# Patient Record
Sex: MALE | Race: BLACK OR AFRICAN AMERICAN
[De-identification: names, ages, dates, MRNs, and addresses within clinical notes are randomized per-mention and may not be internally consistent; named-entity substitution may affect disease eponyms.]

---

## 2018-07-13 ENCOUNTER — HOSPITAL ENCOUNTER (INPATIENT)
Dept: HOSPITAL 74 - JSAMEDAYSX | Age: 33
LOS: 3 days | Discharge: HOME | DRG: 621 | End: 2018-07-16
Attending: SURGERY | Admitting: SURGERY
Payer: COMMERCIAL

## 2018-07-13 VITALS — BODY MASS INDEX: 58.1 KG/M2

## 2018-07-13 DIAGNOSIS — I10: ICD-10-CM

## 2018-07-13 DIAGNOSIS — R16.0: ICD-10-CM

## 2018-07-13 DIAGNOSIS — R94.31: ICD-10-CM

## 2018-07-13 DIAGNOSIS — E66.01: Primary | ICD-10-CM

## 2018-07-13 DIAGNOSIS — R00.0: ICD-10-CM

## 2018-07-13 LAB
ALBUMIN SERPL-MCNC: 3.7 G/DL (ref 3.4–5)
ALP SERPL-CCNC: 86 U/L (ref 45–117)
ALT SERPL-CCNC: 73 U/L (ref 12–78)
ANION GAP SERPL CALC-SCNC: 9 MMOL/L (ref 8–16)
AST SERPL-CCNC: 52 U/L (ref 15–37)
BILIRUB SERPL-MCNC: 0.5 MG/DL (ref 0.2–1)
BUN SERPL-MCNC: 13 MG/DL (ref 7–18)
CALCIUM SERPL-MCNC: 8.4 MG/DL (ref 8.5–10.1)
CHLORIDE SERPL-SCNC: 105 MMOL/L (ref 98–107)
CO2 SERPL-SCNC: 27 MMOL/L (ref 21–32)
CREAT SERPL-MCNC: 1 MG/DL (ref 0.7–1.3)
DEPRECATED RDW RBC AUTO: 13.2 % (ref 11.9–15.9)
GLUCOSE SERPL-MCNC: 153 MG/DL (ref 74–106)
HCT VFR BLD CALC: 39.3 % (ref 35.4–49)
HGB BLD-MCNC: 12.7 GM/DL (ref 11.7–16.9)
MCH RBC QN AUTO: 26.4 PG (ref 25.7–33.7)
MCHC RBC AUTO-ENTMCNC: 32.4 G/DL (ref 32–35.9)
MCV RBC: 81.6 FL (ref 80–96)
PLATELET # BLD AUTO: 315 K/MM3 (ref 134–434)
PMV BLD: 8.2 FL (ref 7.5–11.1)
POTASSIUM SERPLBLD-SCNC: 3.7 MMOL/L (ref 3.5–5.1)
PROT SERPL-MCNC: 7.6 G/DL (ref 6.4–8.2)
RBC # BLD AUTO: 4.82 M/MM3 (ref 4–5.6)
SODIUM SERPL-SCNC: 141 MMOL/L (ref 136–145)
WBC # BLD AUTO: 11.5 K/MM3 (ref 4–10)

## 2018-07-13 PROCEDURE — 0FB24ZX EXCISION OF LEFT LOBE LIVER, PERCUTANEOUS ENDOSCOPIC APPROACH, DIAGNOSTIC: ICD-10-PCS | Performed by: SURGERY

## 2018-07-13 PROCEDURE — 0WJP4ZZ INSPECTION OF GASTROINTESTINAL TRACT, PERCUTANEOUS ENDOSCOPIC APPROACH: ICD-10-PCS | Performed by: SURGERY

## 2018-07-13 PROCEDURE — 0DB64Z3 EXCISION OF STOMACH, PERCUTANEOUS ENDOSCOPIC APPROACH, VERTICAL: ICD-10-PCS | Performed by: SURGERY

## 2018-07-13 RX ADMIN — METOCLOPRAMIDE SCH MG: 5 INJECTION, SOLUTION INTRAMUSCULAR; INTRAVENOUS at 19:25

## 2018-07-13 RX ADMIN — FAMOTIDINE SCH MLS/HR: 20 INJECTION, SOLUTION INTRAVENOUS at 22:36

## 2018-07-13 RX ADMIN — ENOXAPARIN SODIUM SCH MG: 40 INJECTION SUBCUTANEOUS at 22:36

## 2018-07-13 RX ADMIN — SODIUM CHLORIDE SCH MLS: 9 INJECTION, SOLUTION INTRAVENOUS at 21:00

## 2018-07-13 NOTE — SURG
Surgery First Assist Note


First Assist: Willy Medina PA-C


Date of Service: 07/13/18


Diagnosis: 


Morbid obesity due to excess calories





Procedure: 


Laproscopic sleeve gastrectomy and liver biopsy





I was present for the entirety of the operative procedure. For further detail, 

please refer to operative report.

## 2018-07-13 NOTE — OP
Operative Note





- Note:


Operative Date: 07/13/18


Pre-Operative Diagnosis: Morbid Obesity


Operation: Laparoscopic Vertical Sleeve Gastrectomy.  Wedge biopsy of left lobe 

of liver.  Diagnostic Laparoscopy


Findings: 





Greater curve sleeve gastrectomy performed with #40 bougie in place


Wedge biopsy performed on enlarged left lobe of liver


Post-Operative Diagnosis: Same as Pre-op (Hepatomegaly)


Surgeon: Charles Wade


Assistant: Willy Medina


Anesthesia: General


Specimens Removed: Greater curve of stomach.  Wedge biopsy left lobe of liver


Estimated Blood Loss (mls): 30


Operative Report Dictated: Yes

## 2018-07-14 LAB
ALBUMIN SERPL-MCNC: 4 G/DL (ref 3.4–5)
ALP SERPL-CCNC: 90 U/L (ref 45–117)
ALT SERPL-CCNC: 78 U/L (ref 12–78)
ANION GAP SERPL CALC-SCNC: 9 MMOL/L (ref 8–16)
AST SERPL-CCNC: 53 U/L (ref 15–37)
BILIRUB SERPL-MCNC: 0.6 MG/DL (ref 0.2–1)
BUN SERPL-MCNC: 13 MG/DL (ref 7–18)
CALCIUM SERPL-MCNC: 9.2 MG/DL (ref 8.5–10.1)
CHLORIDE SERPL-SCNC: 101 MMOL/L (ref 98–107)
CO2 SERPL-SCNC: 26 MMOL/L (ref 21–32)
CREAT SERPL-MCNC: 1 MG/DL (ref 0.7–1.3)
DEPRECATED RDW RBC AUTO: 13.2 % (ref 11.9–15.9)
GLUCOSE SERPL-MCNC: 128 MG/DL (ref 74–106)
HCT VFR BLD CALC: 40.5 % (ref 35.4–49)
HGB BLD-MCNC: 13.6 GM/DL (ref 11.7–16.9)
MCH RBC QN AUTO: 27.4 PG (ref 25.7–33.7)
MCHC RBC AUTO-ENTMCNC: 33.7 G/DL (ref 32–35.9)
MCV RBC: 81.1 FL (ref 80–96)
PLATELET # BLD AUTO: 355 K/MM3 (ref 134–434)
PMV BLD: 8.6 FL (ref 7.5–11.1)
POTASSIUM SERPLBLD-SCNC: 4.2 MMOL/L (ref 3.5–5.1)
PROT SERPL-MCNC: 8.4 G/DL (ref 6.4–8.2)
RBC # BLD AUTO: 4.99 M/MM3 (ref 4–5.6)
SODIUM SERPL-SCNC: 136 MMOL/L (ref 136–145)
WBC # BLD AUTO: 10.3 K/MM3 (ref 4–10)

## 2018-07-14 RX ADMIN — METOCLOPRAMIDE SCH MG: 5 INJECTION, SOLUTION INTRAMUSCULAR; INTRAVENOUS at 13:04

## 2018-07-14 RX ADMIN — METOCLOPRAMIDE SCH MG: 5 INJECTION, SOLUTION INTRAMUSCULAR; INTRAVENOUS at 01:09

## 2018-07-14 RX ADMIN — ENOXAPARIN SODIUM SCH MG: 40 INJECTION SUBCUTANEOUS at 09:20

## 2018-07-14 RX ADMIN — ENOXAPARIN SODIUM SCH MG: 40 INJECTION SUBCUTANEOUS at 21:58

## 2018-07-14 RX ADMIN — FAMOTIDINE SCH MLS/HR: 20 INJECTION, SOLUTION INTRAVENOUS at 09:20

## 2018-07-14 RX ADMIN — FAMOTIDINE SCH MLS/HR: 20 INJECTION, SOLUTION INTRAVENOUS at 21:58

## 2018-07-14 RX ADMIN — SODIUM CHLORIDE SCH MLS/HR: 9 INJECTION, SOLUTION INTRAVENOUS at 13:04

## 2018-07-14 RX ADMIN — SODIUM CHLORIDE SCH MLS/HR: 4.5 INJECTION, SOLUTION INTRAVENOUS at 17:39

## 2018-07-14 RX ADMIN — METOCLOPRAMIDE SCH MG: 5 INJECTION, SOLUTION INTRAMUSCULAR; INTRAVENOUS at 17:54

## 2018-07-14 RX ADMIN — METOCLOPRAMIDE SCH MG: 5 INJECTION, SOLUTION INTRAMUSCULAR; INTRAVENOUS at 05:49

## 2018-07-14 NOTE — OP
DATE OF OPERATION:  07/13/2018

 

PREOPERATIVE DIAGNOSIS:  Morbid obesity. 

 

POSTOPERATIVE DIAGNOSIS:

1.  Morbid obesity.

2.  Hepatomegaly.  

 

PROCEDURE PERFORMED:  

1.  Laparoscopic vertical sleeve gastrectomy.  

2.  Wedge biopsy of the left lobe of the liver.  

3.  Diagnostic laparoscopy. 

 

OPERATING SURGEON:   Charles Wade MD

 

ASSISTANT:  SAM Read

 

ANESTHESIA:  General.

 

EXPECTED BLOOD LOSS:  30 mL.

 

OPERATIVE PROCEDURE:  The patient was brought into the operating room, placed on the

OR table in the supine position.  All precautions were taken initially including

padding for the back and the feet, and Venodyne boots were placed on both lower

extremities.  At that point, the abdomen was prepped and draped in the usual manner. 

 

 

A Veress needle was placed in the left upper quadrant, and a pneumoperitoneum was

established.  A No. 12 bladeless trocar was placed in the left upper quadrant. 

Through that trocar, a laparoscopic camera was placed.  Under direct vision, a No. 15

bladeless trocar was placed in the midline in a supraumbilical position followed by a

No. 5 bladeless trocar below the right costal margin, and No. 5 bladeless trocar

below the left costal margin.  A Lyn Liver Retractor was then placed in the

epigastrium to retract the left lobe of the liver.

 

The left lobe was noted to be extremely enlarged, and it was decided that a biopsy

would be performed on the undersurface of the liver.  Using the LigaSure device, a

wedge triangular shape was dissected off of the edge of the left lobe and sent off

the field as specimen to Pathology.  The parenchyma of the liver, any bleeding was

easily controlled with the LigaSure.  

 

At this juncture, Anesthesia placed the patient at 20-degree reverse Trendelenburg

position.  The pylorus was noted on the distal stomach and 6 cm were measured

proximally from there.  Here, the operating surgeon lifted the stomach toward the

anterior abdominal wall as the assistant surgeon retracted the gastrocolic ligament

inferiorly.  The LigaSure was then used to dissect the gastrocolic ligament and the

short gastric vessels off the greater curve of the stomach.  This continued in a

superior and vertical direction until the final short gastric vessel between the

superior pole, spleen, and proximal fundus was divided.  

 

At this juncture, Anesthesia advanced a No. 40 bougie along the lesser curve where it

was held by the operating surgeon.  Using the bougie as a guide, a series of staples

was performed, the first two being black-load staples, 6 cm in length, along the

bougie.  This was followed by a series of purple-load staples also 6 cm in length and

also along the bougie until the final staple was fired in the left upper quadrant,

and the greater curve was now completely detached from the lesser curve.  It should

be noted that prior to firing staple, both the anterior and posterior walls were

checked that they were equal, and in the area of the esophagogastric junction,

approximately 1 to 1.5 cm serosa remained on the anterior and posterior surfaces of

the stomach.  

 

At this juncture, saline was placed around the staple line, and Anesthesia inflated

the Bougie, which showed the entire lesser curve was distended.  No signs of

obstruction and no signs leaks were noted.  

 

At this juncture, the greater curve was removed with a No. 15 trocar site and handed

off the field as specimen to Pathology.  Under direct vision, the No. 15 trocar site

was closed with endo-closure device to prevent internal hernia event bleeding.  Under

direct vision, all trocars removed and the pneumoperitoneum released.  

 

All trocar sites then received 0.25% Marcaine, were closed with 4-0 Biosyn in

subcuticular fashion.  Dressings were applied.  Patient awoke from anesthesia and

transferred out of the operating room to the recovery room in stable condition.  

 

 

JEANNE [id number]

DD: 07/13/2018 18:50

DT: 07/14/2018 11:08

Job #:  [id number]

## 2018-07-14 NOTE — PN
Progress Note (short form)





- Note


Progress Note: 





POD#1





Afebrile;


P-82-99


BP-140/90 now (was 180-200 systolic during the evening, trated with Hydralazine

, labetolol)





Pt tolerating PO clear liquids- 2-3 oz TID plus sips of water





Abd- incisions clean, dry





WBC-10.3 (slightly decreased)


H/H-13.6/40.5 (no change)





UGI_ no leak, no obstruction


       contrast flows freely into SB





P- Begin clear liquids- 2 oz PO TID


    OOB-ambulate


    Cont DVT prophylaxis (Lovenox, SCD's)


    Cont Incentive Spirometer

## 2018-07-14 NOTE — PN
Progress Note, Physician


Chief Complaint: 





POD #1 S/P SLEVE GASTRECTOMY


AWAKE ALERT


DENIES SEVERE PAIN


NO FEVERS


URINATED TODAY


HAS NOT PASSED GAS





- Current Medication List


Current Medications: 


Active Medications





Enoxaparin Sodium (Lovenox -)  40 mg SQ BID [facility]


   Last Admin: 07/14/18 09:20 Dose:  40 mg


Fentanyl (Sublimaze Injection -)  50 mcg IVPUSH [id number] PRN


   PRN Reason: PAIN-PACU ORDER X 4 DOSES ONLY


   Last Admin: 07/13/18 20:05 Dose:  50 mcg


Famotidine/Sodium Chloride (Pepcid 20 Mg Premixed Ivpb -)  20 mg in 50 mls @ 

100 mls/hr IVPB BID [facility]


   Last Admin: 07/14/18 09:20 Dose:  100 mls/hr


Sodium Chloride (Normal Saline -)  1,000 mls @ 150 mls/hr IV ASDIR [facility]


   Last Admin: 07/13/18 21:00 Dose:  0 mls


Lactated Ringer's (Lactated Ringers Solution)  1,000 mls @ 75 mls/hr IV ASDIR 

[facility]


   Last Admin: 07/13/18 22:34 Dose:  Not Given


Metoclopramide HCl (Reglan Injection -)  10 mg IVPUSH Q6H [facility]


   Last Admin: 07/14/18 05:49 Dose:  10 mg


Ondansetron HCl (Zofran Injection)  4 mg IVPUSH Q4H PRN


   PRN Reason: NAUSEA AND/OR VOMITING


Oxycodone HCl (Roxicodone -)  5 mg PO Q4H PRN


   PRN Reason: PAIN LEVEL 1-5


Promethazine HCl (Phenergan Injection -)  12.5 mg IVPUSH Q6H PRN


   PRN Reason: NAUSEA-FOR RESCUE AFTER 15 MIN


   Last Admin: 07/13/18 18:56 Dose:  12.5 mg











- Objective


Vital Signs: 


 Vital Signs











Temperature  97.9 F   07/14/18 10:00


 


Pulse Rate  99 H  07/14/18 10:00


 


Respiratory Rate  18   07/14/18 10:00


 


Blood Pressure  141/89   07/14/18 10:00


 


O2 Sat by Pulse Oximetry (%)  95   07/14/18 09:00











Constitutional: Yes: No Distress


Eyes: Yes: WNL


HENT: Yes: WNL


Neck: Yes: WNL


Cardiovascular: Yes: WNL


Respiratory: Yes: WNL


Gastrointestinal: Yes: WNL, Tenderness


Genitourinary: Yes: WNL


Musculoskeletal: Yes: WNL


Extremities: Yes: WNL


Edema: No


Peripheral Pulses WNL: Yes


Integumentary: Yes: WNL


Wound/Incision: Yes: Clean/Dry


Neurological: Yes: WNL


...Motor Strength: WNL


Psychiatric: Yes: WNL


Labs: 


 CBC, BMP





 07/14/18 06:00 





 07/14/18 06:00 











Assessment/Plan





MONITOR LABS


OOB TO CHAIR


START BARIATRIC STAGE 1 DIET PER DR GUAADLUPE


DVT PROPHYLAXIS

## 2018-07-14 NOTE — PN
Progress Note, Physician


Chief Complaint: 


Pt. has hypertension before surgery that went up after surgery in the PACU and 

was treated with labetalol and hydralazine. Lower for a while today but now 

back up.








- Current Medication List


Current Medications: 


Active Medications





Enoxaparin Sodium (Lovenox -)  40 mg SQ BID [facility]


   Last Admin: 07/14/18 09:20 Dose:  40 mg


Fentanyl (Sublimaze Injection -)  50 mcg IVPUSH [id number] PRN


   PRN Reason: PAIN-PACU ORDER X 4 DOSES ONLY


   Last Admin: 07/13/18 20:05 Dose:  50 mcg


Famotidine/Sodium Chloride (Pepcid 20 Mg Premixed Ivpb -)  20 mg in 50 mls @ 

100 mls/hr IVPB BID [facility]


   Last Admin: 07/14/18 09:20 Dose:  100 mls/hr


Sodium Chloride (1/2 Normal Saline)  1,000 mls @ 42 mls/hr IV ASDIR [facility]


   Last Admin: 07/14/18 17:39 Dose:  42 mls/hr


Metoclopramide HCl (Reglan Injection -)  10 mg IVPUSH Q6H [facility]


   Last Admin: 07/14/18 17:54 Dose:  10 mg


Ondansetron HCl (Zofran Injection)  4 mg IVPUSH Q4H PRN


   PRN Reason: NAUSEA AND/OR VOMITING


Oxycodone HCl (Roxicodone -)  5 mg PO Q4H PRN


   PRN Reason: PAIN LEVEL 1-5


Promethazine HCl (Phenergan Injection -)  12.5 mg IVPUSH Q6H PRN


   PRN Reason: NAUSEA-FOR RESCUE AFTER 15 MIN


   Last Admin: 07/13/18 18:56 Dose:  12.5 mg











- Objective


Vital Signs: 


 Vital Signs











Temperature  99.6 F   07/14/18 17:00


 


Pulse Rate  110 H  07/14/18 17:00


 


Respiratory Rate  20   07/14/18 17:00


 


Blood Pressure  191/82   07/14/18 17:00


 


O2 Sat by Pulse Oximetry (%)  95   07/14/18 09:00











Constitutional: Yes: Well Nourished, No Distress, Calm


Neurological: Yes: WNL, Alert, Oriented


Labs: 


 CBC, BMP





 07/14/18 06:00 





 07/14/18 06:00 











Assessment/Plan


POD#1 s/p Laproscopic Gastric Sleeve under GA. Hypertensive. 


Needs primary care to start on antihypertensives until BP is better controlled 

with weight loss.

## 2018-07-15 RX ADMIN — RANITIDINE SCH MG: 150 TABLET ORAL at 21:48

## 2018-07-15 RX ADMIN — METOCLOPRAMIDE SCH MG: 5 INJECTION, SOLUTION INTRAMUSCULAR; INTRAVENOUS at 06:20

## 2018-07-15 RX ADMIN — SODIUM CHLORIDE SCH MLS/HR: 4.5 INJECTION, SOLUTION INTRAVENOUS at 15:52

## 2018-07-15 RX ADMIN — METOCLOPRAMIDE SCH MG: 5 INJECTION, SOLUTION INTRAMUSCULAR; INTRAVENOUS at 17:52

## 2018-07-15 RX ADMIN — METOCLOPRAMIDE SCH MG: 5 INJECTION, SOLUTION INTRAMUSCULAR; INTRAVENOUS at 13:51

## 2018-07-15 RX ADMIN — ENOXAPARIN SODIUM SCH MG: 40 INJECTION SUBCUTANEOUS at 21:47

## 2018-07-15 RX ADMIN — ENOXAPARIN SODIUM SCH MG: 40 INJECTION SUBCUTANEOUS at 09:23

## 2018-07-15 RX ADMIN — AMLODIPINE BESYLATE SCH MG: 10 TABLET ORAL at 09:24

## 2018-07-15 RX ADMIN — METOCLOPRAMIDE SCH MG: 5 INJECTION, SOLUTION INTRAMUSCULAR; INTRAVENOUS at 00:47

## 2018-07-15 RX ADMIN — FAMOTIDINE SCH MLS/HR: 20 INJECTION, SOLUTION INTRAVENOUS at 09:24

## 2018-07-15 RX ADMIN — HYDROCHLOROTHIAZIDE SCH MG: 12.5 CAPSULE ORAL at 09:24

## 2018-07-15 NOTE — PN
Progress Note, Physician


Chief Complaint: 





AWAKE ALERDENIES CHEST PAIN OR SOB


BP STILL ELEVATED





- Current Medication List


Current Medications: 


Active Medications





Amlodipine Besylate (Norvasc -)  10 mg PO DAILY [facility]


   Last Admin: 07/15/18 09:24 Dose:  10 mg


Enoxaparin Sodium (Lovenox -)  40 mg SQ BID [facility]


   Last Admin: 07/15/18 09:23 Dose:  40 mg


Fentanyl (Sublimaze Injection -)  50 mcg IVPUSH [id number] PRN


   PRN Reason: PAIN-PACU ORDER X 4 DOSES ONLY


   Last Admin: 07/13/18 20:05 Dose:  50 mcg


Hydrochlorothiazide (Hctz -)  12.5 mg PO DAILY [facility]


   Last Admin: 07/15/18 09:24 Dose:  12.5 mg


Famotidine/Sodium Chloride (Pepcid 20 Mg Premixed Ivpb -)  20 mg in 50 mls @ 

100 mls/hr IVPB BID [facility]


   Last Admin: 07/15/18 09:24 Dose:  100 mls/hr


Sodium Chloride (1/2 Normal Saline)  1,000 mls @ 42 mls/hr IV ASDIR [facility]


   Last Admin: 07/14/18 17:39 Dose:  42 mls/hr


Metoclopramide HCl (Reglan Injection -)  10 mg IVPUSH Q6H [facility]


   Last Admin: 07/15/18 06:20 Dose:  10 mg


Ondansetron HCl (Zofran Injection)  4 mg IVPUSH Q4H PRN


   PRN Reason: NAUSEA AND/OR VOMITING


Oxycodone HCl (Roxicodone -)  5 mg PO Q4H PRN


   PRN Reason: PAIN LEVEL 1-5


   Last Admin: 07/15/18 03:54 Dose:  5 mg


Promethazine HCl (Phenergan Injection -)  12.5 mg IVPUSH Q6H PRN


   PRN Reason: NAUSEA-FOR RESCUE AFTER 15 MIN


   Last Admin: 07/13/18 18:56 Dose:  12.5 mg











- Objective


Vital Signs: 


 Vital Signs











Temperature  99 F   07/15/18 05:00


 


Pulse Rate  106 H  07/14/18 22:00


 


Respiratory Rate  20   07/15/18 09:00


 


Blood Pressure  158/104   07/15/18 09:00


 


O2 Sat by Pulse Oximetry (%)  95   07/14/18 21:00











Constitutional: Yes: No Distress


Eyes: Yes: WNL


HENT: Yes: WNL


Neck: Yes: WNL


Cardiovascular: Yes: WNL


Respiratory: Yes: WNL


Gastrointestinal: Yes: Tenderness


Genitourinary: Yes: WNL


Musculoskeletal: Yes: WNL


Extremities: Yes: WNL


Edema: No


Peripheral Pulses WNL: Yes


Integumentary: Yes: WNL


Wound/Incision: Yes: Clean/Dry


Neurological: Yes: WNL


...Motor Strength: WNL


Psychiatric: Yes: WNL


Labs: 


 CBC, BMP





 07/14/18 06:00 





 07/14/18 06:00 











Problem List





- Problems








(2) Hypertension


Code(s): I10 - ESSENTIAL (PRIMARY) HYPERTENSION   








Assessment/Plan





STARTED AMLODIPINE 10MG DAILY


HCTZ 12.5MG DAILY


MONITOR UNTIL THE AFTERNOON


USE BARIATRIC BP CUFF


PASSING GAS


NO FEVERS


VOIDING URINE NO RETENTION


IF BP IMPROVED CAN GO HOME TODAY IF BP BETTER

## 2018-07-15 NOTE — PN
Progress Note (short form)





- Note


Progress Note: 





POD#2


Afebrile


BP-140-170/


P-





Pt doing well


Tolerating PO clear liquids- 3 oz PO TID


No N/V


Ambulating well


using incentive spirometer





P/E- all trocar sites clean, dry





P- PO clear liquids- 3 OZ PO TID


    Cont DVT prophylaxis


    Cont Lovenox, SCD's


    May be D/C if BP improved

## 2018-07-16 VITALS — HEART RATE: 100 BPM | TEMPERATURE: 98.8 F

## 2018-07-16 VITALS — SYSTOLIC BLOOD PRESSURE: 150 MMHG | DIASTOLIC BLOOD PRESSURE: 68 MMHG

## 2018-07-16 RX ADMIN — AMLODIPINE BESYLATE SCH MG: 10 TABLET ORAL at 09:16

## 2018-07-16 RX ADMIN — RANITIDINE SCH MG: 150 TABLET ORAL at 09:16

## 2018-07-16 RX ADMIN — HYDROCHLOROTHIAZIDE SCH MG: 12.5 CAPSULE ORAL at 09:16

## 2018-07-16 RX ADMIN — ENOXAPARIN SODIUM SCH MG: 40 INJECTION SUBCUTANEOUS at 09:16

## 2018-07-16 NOTE — PN
Progress Note, Physician


Chief Complaint: 





patient seen and examined feeling better wants to go home


cardiology consulted for elevated BP and tachycardia





- Current Medication List


Current Medications: 


Active Medications





Amlodipine Besylate (Norvasc -)  10 mg PO DAILY [facility]


   Last Admin: 07/16/18 09:16 Dose:  10 mg


Enoxaparin Sodium (Lovenox -)  40 mg SQ BID [facility]


   Last Admin: 07/16/18 09:16 Dose:  40 mg


Fentanyl (Sublimaze Injection -)  50 mcg IVPUSH [id number] PRN


   PRN Reason: PAIN-PACU ORDER X 4 DOSES ONLY


   Last Admin: 07/13/18 20:05 Dose:  50 mcg


Hydrochlorothiazide (Hctz -)  12.5 mg PO DAILY [facility]


   Last Admin: 07/16/18 09:16 Dose:  12.5 mg


Sodium Chloride (1/2 Normal Saline)  1,000 mls @ 42 mls/hr IV ASDIR [facility]


   Last Admin: 07/15/18 15:52 Dose:  42 mls/hr


Metoprolol Succinate (Toprol Xl -)  25 mg PO BID [facility]


   Last Admin: 07/16/18 09:16 Dose:  25 mg


Ondansetron HCl (Zofran Injection)  4 mg IVPUSH Q4H PRN


   PRN Reason: NAUSEA AND/OR VOMITING


Oxycodone HCl (Roxicodone -)  5 mg PO Q4H PRN


   PRN Reason: PAIN LEVEL 1-5


   Last Admin: 07/15/18 21:49 Dose:  5 mg


Promethazine HCl (Phenergan Injection -)  12.5 mg IVPUSH Q6H PRN


   PRN Reason: NAUSEA-FOR RESCUE AFTER 15 MIN


   Last Admin: 07/13/18 18:56 Dose:  12.5 mg


Ranitidine HCl (Zantac -)  150 mg PO BID [facility]


   Last Admin: 07/16/18 09:16 Dose:  150 mg











- Objective


Vital Signs: 


 Vital Signs











Temperature  98.8 F   07/16/18 06:00


 


Pulse Rate  100 H  07/16/18 09:01


 


Respiratory Rate  18   07/16/18 08:00


 


Blood Pressure  150/68   07/16/18 09:01


 


O2 Sat by Pulse Oximetry (%)  96   07/16/18 08:00











Constitutional: Yes: Calm


Cardiovascular: Yes: Regular Rate and Rhythm, S1, S2


Respiratory: Yes: CTA Bilaterally


Gastrointestinal: Yes: Normal Bowel Sounds, Soft, Other (3 bandages)


Edema: No


Neurological: Yes: Alert, Oriented


Labs: 


 CBC, BMP





 07/14/18 06:00 





 07/14/18 06:00 











Problem List





- Problems


(1) Hypertension


Assessment/Plan: 


norvasc  metoprolol bid and hctz


cardiology consulted


Code(s): I10 - ESSENTIAL (PRIMARY) HYPERTENSION   





(2) S/P gastrectomy


Assessment/Plan: 


on clear liquid diet


surgery on board

## 2018-07-16 NOTE — CON.CARD
Consult


Consult Specialty:: Cardiology


Referred by:: Dr. Rosario


Reason for Consultation:: Hypertension, nonspecific ST abnormality on EKG





- History of Present Illness


Chief Complaint: high blood pressure, s/p sleeve gastrectomy


History of Present Illness: 





Patient had sleeve gastrectomy on 7/13, postoperatively had elevated BP as high 

as 200/105 and was tachycardic.  Also noted to have nonspecific EKG changes on 

EKG done today.  Patient is feeling well.  Denies chest pain, dyspnea, or other 

complaint. He says he has minimal post operative pain.  No history of HTN but 

per patient has had higher BP at some clinic visits in the past, was not on 

medications prior.  Has been started during this admission on metoprolol, HCTZ 

and amlodipine with improvement in BP.





- History Source


History Provided By: Patient


Limitations to Obtaining History: No Limitations





- Past Medical History


Cardio/Vascular: No: AFIB, Aneurysm, Aortic Insufficiency, Aortic Stenosis, CAD

, CHF, Deep Vein Thrombosis, HTN, Hyperlipdemia, MI, Mitral Insufficiency, 

Mitral Stenosis, Murmur, Pulmonary Hypertension, Other





- Past Surgical History


Past Surgical History: Yes: Bariatric Surgery





- Alcohol/Substance Use


Hx Alcohol Use: No (rare)





- Smoking History


Smoking history: Never smoked


Aproximately how many cigarettes per day: 0





Home Medications





- Allergies


Allergies/Adverse Reactions: 


 Allergies











Allergy/AdvReac Type Severity Reaction Status Date / Time


 


egg yolk [Egg Yolk] Allergy Mild Hives Verified 07/11/18 14:06


 


No Known Drug Allergies Allergy Mild Hives Verified 07/11/18 14:06


 


CITRUS Allergy Mild TONGUE Uncoded 07/11/18 14:06





   SORES  


 


WALNUTS Allergy   Uncoded 07/11/18 14:06














- Home Medications


Home Medications: 


Ambulatory Orders





Famotidine [Pepcid] 20 mg PO BID #60 tablet 07/13/18 


Oxycodone HCl/Acetaminophen [Percocet 5-325 mg Tablet] 1 tab PO Q6H PRN #20 

tablet MDD 4 07/13/18 











Review of Systems





- Review of Systems


Constitutional: denies: No Symptoms, Chills, Diaphoresis, Fever, Lethargy, Loss 

of Appetite, Malaise, Night Sweats, Unintentional Wgt. Loss, Weakness, Other


Eyes: denies: No Symptoms, Blind Spots, Blurred Vision, Double Vision, Eye Pain

, Floaters, Photophobia, Recent Change in Vision, Other


HENT: denies: No Symptoms, Difficult Swallowing, Ear Discharge, Ear Pain, 

Epistaxis, Gingival Bleeding, Hearing Loss, Mouth Swelling, Nasal Congestion, 

Ocular Prosthesis, Throat Pain, Toothache, Ringing in Ears, Other


Neck: denies: No Symptoms, Decreased ROM, Lumps, Pain on Movement, Stiffness, 

Swollen Glands, Tenderness, Other


Cardiovascular: denies: No Symptoms, Chest Pain, Edema, Palpitations, Shortness 

of Breath, Other


Respiratory: denies: No Symptoms, Cough, Exercise Intolerance, Hemoptysis, 

Orthopnea, PND, Snoring, SOB, SOB on Exertion, Wheezing, Other


Gastrointestinal: reports: Abdominal Pain (mild, post surgery)


Genitourinary: denies: No Symptoms, Burning, Discharge, Dysuria, Flank Pain, 

Frequency, Hematuria, Incontinence, Lesions, Menses, Pain, Testicular Mass, 

Testicular Pain, Testicular Swelling, Urgency, Vaginal Bleeding, Other


Musculoskeletal: denies: No Symptoms, Back Pain, Crepitus, Decreased ROM, 

Extremity Pain, Joint Pain, Joint Swelling, Muscle Pain, Muscle Cramps, Muscle 

Weakness, Other


Integumentary: denies: No Symptoms, Blister, Bruising, Change in Color, Eczema, 

Erythema, Incision, Lesions, Lump, Pallor, Pruritis, Rash, Wound, Other


Neurological: denies: No Symptoms, Change in LOC, Change in Speech, Confusion, 

Dizziness, Headache, Incoordination, Numbness, Parasthesia, Pre-Existing Deficit

, Seizure, Syncope, Tremors, Unsteady Gait, Weakness, Other


Hematology/Lymphatic: denies: No Symptoms, Easily Bruised, Excessive Bleeding, 

Swollen Glands, Other


Vital Signs: 


 Vital Signs











Temperature  98.8 F   07/16/18 06:00


 


Pulse Rate  100 H  07/16/18 09:01


 


Respiratory Rate  18   07/16/18 08:00


 


Blood Pressure  150/68   07/16/18 09:01


 


O2 Sat by Pulse Oximetry (%)  96   07/16/18 08:00











Constitutional: Yes: Well Nourished, No Distress, Calm


Eyes: Yes: WNL, Conjunctiva Clear, EOM Intact


HENT: Yes: WNL, Atraumatic, Normocephalic


Neck: Yes: WNL, Supple, Trachea Midline


Respiratory: Yes: WNL, Regular, CTA Bilaterally


Gastrointestinal: Yes: WNL, Normal Bowel Sounds


Cardiovascular: Yes: WNL, Regular Rate and Rhythm


Heart Sounds: Yes: S1, S2


Musculoskeletal: Yes: WNL


Extremities: Yes: WNL


Edema: No





- Other Data


Labs, Other Data: 


 CBC, BMP





 07/14/18 06:00 





 07/14/18 06:00 











7/15/18 Sinus rhythm with sinus arrhythmia, nonspecific ST changes, no ischemic 

changes. Stable compared to prior EKG from 4/26/18





Problem List





- Problems


(1) Hypertension


Assessment/Plan: 


Improving with current medications started in the last two days


Continue metoprolol, amlodipine and HCTZ at current doses


Follow up as an outpatient for continued BP monitoring


Code(s): I10 - ESSENTIAL (PRIMARY) HYPERTENSION   





(2) EKG abnormalities


Assessment/Plan: 


Stable compared to preop EKG from 4/2018


Patient is asymptomatic


No further work up at this point


Code(s): R94.31 - ABNORMAL ELECTROCARDIOGRAM [ECG] [EKG]

## 2018-07-16 NOTE — DS
Physical Examination


Vital Signs: 


 Vital Signs











Temperature  98.8 F   07/16/18 06:00


 


Pulse Rate  100 H  07/16/18 09:01


 


Respiratory Rate  18   07/16/18 08:00


 


Blood Pressure  150/68   07/16/18 09:01


 


O2 Sat by Pulse Oximetry (%)  96   07/16/18 08:00











Constitutional: Yes: No Distress


Eyes: Yes: WNL


HENT: Yes: WNL


Neck: Yes: WNL


Cardiovascular: Yes: WNL


Respiratory: Yes: [place]


Gastrointestinal: Yes: WNL


Musculoskeletal: Yes: Muscle Weakness


Extremities: Yes: WNL


Edema: No


Peripheral Pulses WNL: Yes


Integumentary: Yes: WNL


Wound/Incision: Yes: Clean/Dry


Neurological: Yes: WNL


...Motor Strength: WNL


Psychiatric: Yes: WNL


Labs: 


 CBC, BMP





 07/14/18 06:00 





 07/14/18 06:00 











Discharge Summary


Reason For Visit: MORBID OBESITY


Current Active Problems





EKG abnormalities (Acute)


Hypertension (Acute)


S/P gastrectomy (Acute)


S/P gastrectomy (Acute)








Hospital Course: 





SLEEVE GASTRECTOMY, MALIGNANT HTN TREATED, 


Condition: Good





- Instructions


Diet, Activity, Other Instructions: 


PO clear liquids- 3 oz PO 4-5 times per day


May have 1 cup of water/ice chips Q2H





May shower beginning 7/16/2018





No lifting over 30 pounds for 3 weeks





F/U with Dr Wade on 7/19/2018





SEE DR GRUBBS THIS WEEK FOR BP CHECK


Disposition: HOME





- Home Medications


Comprehensive Discharge Medication List: 


Ambulatory Orders





Famotidine [Pepcid] 20 mg PO BID #60 tablet 07/13/18 


Oxycodone HCl/Acetaminophen [Percocet 5-325 mg Tablet] 1 tab PO Q6H PRN #20 

tablet MDD 4 07/13/18 


Amlodipine Besylate [Norvasc -] 10 mg PO DAILY #30 tablet 07/16/18 


Amlodipine Besylate [Norvasc -] 10 mg PO DAILY #30 tablet 07/16/18 


Hydrochlorothiazide [Hctz -] 12.5 mg PO DAILY #30 cap 07/16/18 


Hydrochlorothiazide [Hctz -] 12.5 mg PO DAILY #30 cap 07/16/18 


Metoprolol Succinate [Toprol XL -] 25 mg PO BID #60 tab.sr.24h 07/16/18 


Metoprolol Succinate [Toprol XL -] 25 mg PO BID #60 tab.sr.24h 07/16/18 


Ranitidine [Zantac -] 150 mg PO BID #60 tablet 07/16/18

## 2018-07-16 NOTE — EKG
Test Reason : 

Blood Pressure : ***/*** mmHG

Vent. Rate : 088 BPM     Atrial Rate : 088 BPM

   P-R Int : 160 ms          QRS Dur : 078 ms

    QT Int : 350 ms       P-R-T Axes : 070 015 -03 degrees

   QTc Int : 423 ms

 

NORMAL SINUS RHYTHM WITH SINUS ARRHYTHMIA

NONSPECIFIC ST AND T WAVE ABNORMALITY

ABNORMAL ECG

WHEN COMPARED WITH ECG OF 26-APR-2018 08:42,

NO SIGNIFICANT CHANGE WAS FOUND

Confirmed by SVEN VERDIN MD ([id number]) on 7/16/2018 1:19:19 PM

 

Referred By: Charles Wade           Confirmed By:SVEN VERDIN MD

## 2022-03-01 ENCOUNTER — HOSPITAL ENCOUNTER (EMERGENCY)
Dept: HOSPITAL 74 - FER | Age: 37
Discharge: HOME | End: 2022-03-01
Payer: COMMERCIAL

## 2022-03-01 VITALS — DIASTOLIC BLOOD PRESSURE: 82 MMHG | HEART RATE: 97 BPM | TEMPERATURE: 99.8 F | SYSTOLIC BLOOD PRESSURE: 148 MMHG

## 2022-03-01 VITALS — BODY MASS INDEX: 50.2 KG/M2
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
## 2022-03-01 DIAGNOSIS — S46.911A: Primary | ICD-10-CM
